# Patient Record
(demographics unavailable — no encounter records)

---

## 2025-07-02 NOTE — PHYSICAL EXAM
[No Acute Distress] : no acute distress [Well-Appearing] : well-appearing [No Edema] : there was no peripheral edema [No Extremity Clubbing/Cyanosis] : no extremity clubbing/cyanosis [No CVA Tenderness] : no CVA  tenderness [No Spinal Tenderness] : no spinal tenderness [No Joint Swelling] : no joint swelling [Grossly Normal Strength/Tone] : grossly normal strength/tone [No Focal Deficits] : no focal deficits [Normal Gait] : normal gait [Normal] : affect was normal and insight and judgment were intact [de-identified] : lance banks [de-identified] : seborrheic keratosis

## 2025-07-02 NOTE — HEALTH RISK ASSESSMENT
[Good] : ~his/her~  mood as  good [No] : In the past 12 months have you used drugs other than those required for medical reasons? No [No falls in past year] : Patient reported no falls in the past year [0] : 2) Feeling down, depressed, or hopeless: Not at all (0) [Yes] : takes [Never] : Never [Change in mental status noted] : No change in mental status noted [None] : None [Alone] : lives alone [Single] : single [Feels Safe at Home] : Feels safe at home [Fully functional (bathing, dressing, toileting, transferring, walking, feeding)] : Fully functional (bathing, dressing, toileting, transferring, walking, feeding) [Fully functional (using the telephone, shopping, preparing meals, housekeeping, doing laundry, using] : Fully functional and needs no help or supervision to perform IADLs (using the telephone, shopping, preparing meals, housekeeping, doing laundry, using transportation, managing medications and managing finances) [Reports changes in hearing] : Reports changes in hearing [Reports changes in vision] : Reports no changes in vision [Reports changes in dental health] : Reports no changes in dental health [With Patient/Caregiver] : , with patient/caregiver [Reviewed no changes] : Reviewed, no changes [Aggressive treatment] : aggressive treatment [AdvancecareDate] : 07/25 [FreeTextEntry4] :  Identifies Devin Mcfarland (cousin) ([phone]) and Ramone Shah (cousin) ([phone]) as surrogate decision maker.

## 2025-07-02 NOTE — PHYSICAL EXAM
[No Acute Distress] : no acute distress [Well-Appearing] : well-appearing [No Edema] : there was no peripheral edema [No Extremity Clubbing/Cyanosis] : no extremity clubbing/cyanosis [No CVA Tenderness] : no CVA  tenderness [No Spinal Tenderness] : no spinal tenderness [No Joint Swelling] : no joint swelling [Grossly Normal Strength/Tone] : grossly normal strength/tone [No Focal Deficits] : no focal deficits [Normal Gait] : normal gait [Normal] : affect was normal and insight and judgment were intact [de-identified] : lance banks [de-identified] : seborrheic keratosis

## 2025-07-02 NOTE — HISTORY OF PRESENT ILLNESS
[FreeTextEntry1] : annual [de-identified] : 82M h/o absence seizure, HTN, HLD presents for physical. Pt reports earwax in his left ear with somewhat decreased hearing though is still able to hear out of his left ear. Otherwise feels well without acute concerns.

## 2025-07-02 NOTE — PHYSICAL EXAM
[No Acute Distress] : no acute distress [Well-Appearing] : well-appearing [No Edema] : there was no peripheral edema [No Extremity Clubbing/Cyanosis] : no extremity clubbing/cyanosis [No CVA Tenderness] : no CVA  tenderness [No Spinal Tenderness] : no spinal tenderness [No Joint Swelling] : no joint swelling [Grossly Normal Strength/Tone] : grossly normal strength/tone [No Focal Deficits] : no focal deficits [Normal Gait] : normal gait [Normal] : affect was normal and insight and judgment were intact [de-identified] : lance banks [de-identified] : seborrheic keratosis

## 2025-07-02 NOTE — HISTORY OF PRESENT ILLNESS
[FreeTextEntry1] : annual [de-identified] : 82M h/o absence seizure, HTN, HLD presents for physical. Pt reports earwax in his left ear with somewhat decreased hearing though is still able to hear out of his left ear. Otherwise feels well without acute concerns.

## 2025-07-02 NOTE — HEALTH RISK ASSESSMENT
[Good] : ~his/her~  mood as  good [No] : In the past 12 months have you used drugs other than those required for medical reasons? No [No falls in past year] : Patient reported no falls in the past year [0] : 2) Feeling down, depressed, or hopeless: Not at all (0) [Yes] : takes [Never] : Never [Change in mental status noted] : No change in mental status noted [None] : None [Alone] : lives alone [Single] : single [Feels Safe at Home] : Feels safe at home [Fully functional (bathing, dressing, toileting, transferring, walking, feeding)] : Fully functional (bathing, dressing, toileting, transferring, walking, feeding) [Fully functional (using the telephone, shopping, preparing meals, housekeeping, doing laundry, using] : Fully functional and needs no help or supervision to perform IADLs (using the telephone, shopping, preparing meals, housekeeping, doing laundry, using transportation, managing medications and managing finances) [Reports changes in hearing] : Reports changes in hearing [Reports changes in vision] : Reports no changes in vision [Reports changes in dental health] : Reports no changes in dental health [With Patient/Caregiver] : , with patient/caregiver [Reviewed no changes] : Reviewed, no changes [Aggressive treatment] : aggressive treatment [AdvancecareDate] : 07/25 [FreeTextEntry4] :  Identifies Devin Mcfarland (cousin) ([phone]) and Ramone Shah (cousin) ([phone]) as surrogate decision maker.

## 2025-07-02 NOTE — HISTORY OF PRESENT ILLNESS
[FreeTextEntry1] : annual [de-identified] : 82M h/o absence seizure, HTN, HLD presents for physical. Pt reports earwax in his left ear with somewhat decreased hearing though is still able to hear out of his left ear. Otherwise feels well without acute concerns.

## 2025-07-02 NOTE — HEALTH RISK ASSESSMENT
[Good] : ~his/her~  mood as  good [No] : In the past 12 months have you used drugs other than those required for medical reasons? No [No falls in past year] : Patient reported no falls in the past year [0] : 2) Feeling down, depressed, or hopeless: Not at all (0) [Yes] : takes [Never] : Never [Change in mental status noted] : No change in mental status noted [None] : None [Alone] : lives alone [Single] : single [Feels Safe at Home] : Feels safe at home [Fully functional (bathing, dressing, toileting, transferring, walking, feeding)] : Fully functional (bathing, dressing, toileting, transferring, walking, feeding) [Fully functional (using the telephone, shopping, preparing meals, housekeeping, doing laundry, using] : Fully functional and needs no help or supervision to perform IADLs (using the telephone, shopping, preparing meals, housekeeping, doing laundry, using transportation, managing medications and managing finances) [Reports changes in hearing] : Reports changes in hearing [Reports changes in vision] : Reports no changes in vision [Reports changes in dental health] : Reports no changes in dental health [With Patient/Caregiver] : , with patient/caregiver [Reviewed no changes] : Reviewed, no changes [Aggressive treatment] : aggressive treatment [AdvancecareDate] : 07/25 [FreeTextEntry4] :  Identifies Devin Mcfarland (cousin) ([phone]) and Ramone Shah (cousin) ([phone]) as surrogate decision maker.